# Patient Record
Sex: FEMALE | Race: WHITE | NOT HISPANIC OR LATINO | Employment: FULL TIME | URBAN - METROPOLITAN AREA
[De-identification: names, ages, dates, MRNs, and addresses within clinical notes are randomized per-mention and may not be internally consistent; named-entity substitution may affect disease eponyms.]

---

## 2021-03-26 DIAGNOSIS — Z23 ENCOUNTER FOR IMMUNIZATION: ICD-10-CM

## 2021-06-15 ENCOUNTER — VBI (OUTPATIENT)
Dept: ADMINISTRATIVE | Facility: OTHER | Age: 51
End: 2021-06-15

## 2021-06-15 RX ORDER — PROPRANOLOL/HYDROCHLOROTHIAZID 40 MG-25MG
1 TABLET ORAL DAILY
COMMUNITY

## 2021-06-15 RX ORDER — CALCIUM CARBONATE 300MG(750)
1 TABLET,CHEWABLE ORAL DAILY
COMMUNITY

## 2021-06-15 RX ORDER — PRASTERONE (DHEA) 50 MG
1 CAPSULE ORAL DAILY
COMMUNITY

## 2021-06-15 RX ORDER — ASPIRIN 325 MG
1 TABLET, DELAYED RELEASE (ENTERIC COATED) ORAL EVERY 24 HOURS
COMMUNITY

## 2021-06-15 RX ORDER — BACILLUS COAGULANS/INULIN 1B-250 MG
1 CAPSULE ORAL EVERY 24 HOURS
COMMUNITY

## 2021-06-15 NOTE — TELEPHONE ENCOUNTER
Upon review of the In Basket request we were able to locate, review, and update the patient chart as requested for Immunization(s) Influenza, Mammogram and Pap Smear (HPV) aka Cervical Cancer Screening  Any additional questions or concerns should be emailed to the Practice Liaisons via [email or web address] email, please do not reply via In Basket      Thank you  Kathryn Henderson

## 2021-06-16 ENCOUNTER — OFFICE VISIT (OUTPATIENT)
Dept: OBGYN CLINIC | Facility: CLINIC | Age: 51
End: 2021-06-16
Payer: COMMERCIAL

## 2021-06-16 VITALS
WEIGHT: 126 LBS | BODY MASS INDEX: 23.19 KG/M2 | HEIGHT: 62 IN | SYSTOLIC BLOOD PRESSURE: 100 MMHG | DIASTOLIC BLOOD PRESSURE: 68 MMHG

## 2021-06-16 DIAGNOSIS — N90.89 VULVAR LESION: Primary | ICD-10-CM

## 2021-06-16 PROCEDURE — 99213 OFFICE O/P EST LOW 20 MIN: CPT | Performed by: OBSTETRICS & GYNECOLOGY

## 2021-06-16 NOTE — PROGRESS NOTES
Assessment/Plan:      Diagnoses and all orders for this visit:    Vulvar lesion     -  Informed pt of findings c/w with small acne  Reassured pt of findings negative for any STD, infection or abscess  Advised to observe and inform GYN if acne increases in size    Other orders  -     Bacillus Coagulans-Inulin (Probiotic) 1-250 BILLION-MG CAPS; Take 1 capsule by mouth every 24 hours  -     aspirin (ECOTRIN) 325 mg EC tablet; Take 1 tablet by mouth every 24 hours  -     Elderberry 575 MG/5ML SYRP; Take by mouth daily  -     Turmeric 500 MG CAPS; Take 1 capsule by mouth daily  -     Cholecalciferol (Vitamin D3) 25 MCG (1000 UT) CHEW; Chew 1 tablet daily  -     Becky 500 MG CAPS; Take 1 capsule by mouth daily  -     Cancel: Mammo screening bilateral w 3d & cad; Future          Subjective:     Patient ID: Tammi Briggs is a 49 y o  female  Pt presents after having pain in right labia during riding a bike  On self exam patient noted a bump on right labia      Review of Systems   Constitutional: Negative for chills and fever  Gastrointestinal: Negative for abdominal pain  Genitourinary: Positive for genital sores  Negative for dysuria, pelvic pain and vaginal pain  Hematological: Negative for adenopathy  Objective:     Physical Exam  Abdominal:      Hernia: There is no hernia in the left inguinal area or right inguinal area  Genitourinary:     Exam position: Lithotomy position  Pubic Area: No rash  Labia:         Right: Lesion present  No rash, tenderness or injury  Left: No rash, tenderness, lesion or injury  Urethra: No prolapse or urethral pain  Comments: Right labia majora with acne measuring 1-2 mm, not able to express collection  Lymphadenopathy:      Lower Body: No right inguinal adenopathy  No left inguinal adenopathy

## 2022-05-16 ENCOUNTER — TELEPHONE (OUTPATIENT)
Dept: OBGYN CLINIC | Facility: CLINIC | Age: 52
End: 2022-05-16

## 2022-05-16 NOTE — TELEPHONE ENCOUNTER
Nichole l/tammie requesting mammogram order  Has well annual scheduled 6/2/2022  Prior mammo 7/22/2021  L/M on Nichole's v/tammie not due for mammogram until after 7/22/2022  Mammogram order will be provided at time of well annual 6/2/2022

## 2022-05-20 ENCOUNTER — TELEPHONE (OUTPATIENT)
Dept: OBGYN CLINIC | Facility: CLINIC | Age: 52
End: 2022-05-20

## 2022-05-20 NOTE — TELEPHONE ENCOUNTER
Pt called informing for over the past week she has developed nipple pain, tenderness, redness and states, " feels as if they are chaffed "  Pt has applied lanolin cream with no relief  Pt informed she does workout regularly, has tried changing her bra  Denies nipple drainage  Pt transferred to reception to schedule a breast check

## 2022-05-25 ENCOUNTER — OFFICE VISIT (OUTPATIENT)
Dept: OBGYN CLINIC | Facility: CLINIC | Age: 52
End: 2022-05-25
Payer: COMMERCIAL

## 2022-05-25 VITALS
HEIGHT: 62 IN | DIASTOLIC BLOOD PRESSURE: 70 MMHG | SYSTOLIC BLOOD PRESSURE: 118 MMHG | WEIGHT: 127.2 LBS | BODY MASS INDEX: 23.41 KG/M2

## 2022-05-25 DIAGNOSIS — N64.4 NIPPLE PAIN: Primary | ICD-10-CM

## 2022-05-25 PROCEDURE — 99213 OFFICE O/P EST LOW 20 MIN: CPT | Performed by: OBSTETRICS & GYNECOLOGY

## 2022-05-25 RX ORDER — TRIAMCINOLONE ACETONIDE 1 MG/G
CREAM TOPICAL 2 TIMES DAILY
Qty: 15 G | Refills: 0 | Status: SHIPPED | OUTPATIENT
Start: 2022-05-25

## 2022-05-25 NOTE — PROGRESS NOTES
[address] [facility], [address], [facility], [address]    Assessment/Plan:  1  Nipple pain  Comments:  c/o right nipple pain over last two weeks  Some improvement with lanolin to nipple  No other breast changes  A/P:   Exam essentially normal with mild irritation of right nipple  Likely trauma and inflammation  Recom bid topical steroid  If no improvement in 2 weeks call office  Pt has Elias Sanchez in  with Dr Cameron Valadez - will have exam then  Orders:  -     triamcinolone (KENALOG) 0 1 % cream; Apply topically 2 (two) times a day To affected area as needed        Subjective:   Mehnaz Winter is a 47 y o   female  CC: nipple pain      HPI:   Lori [place] presents today with c/o right nipple chafing and pain, reminds patient of issues when breastfeeding  First noted about  2 weeks ago noted chafing and pain around right nipple - noted after cleaning the house without a bra on  No pain left  Has tried applying lanolin - somewhat improved  No other changes to breasts  No nipple leaking    2021 mammogram birads 1  Gyn History  Patient's last menstrual period was 2022 (approximate)  Last pap smear: 2017    She  reports being sexually active and has had partner(s) who are male  She reports using the following method of birth control/protection: Male Sterilization         OB History      Past Medical History:  No date:  2 para 2  2017: Papanicolaou smear     Past Surgical History:  2000: APPENDECTOMY  2020: BUNIONECTOMY     Social History     Tobacco Use    Smoking status: Never Smoker    Smokeless tobacco: Never Used   Vaping Use    Vaping Use: Never used   Substance Use Topics    Alcohol use: Yes     Comment: socially    Drug use: Never     Comment: No use          Current Outpatient Medications:     triamcinolone (KENALOG) 0 1 % cream, Apply topically 2 (two) times a day To affected area as needed, Disp: 15 g, Rfl: 0    aspirin (ECOTRIN) 325 mg EC tablet, Take 1 tablet by mouth every 24 hours (Patient not taking: Reported on 5/25/2022), Disp: , Rfl:     Bacillus Coagulans-Inulin (Probiotic) 1-250 BILLION-MG CAPS, Take 1 capsule by mouth every 24 hours (Patient not taking: Reported on 5/25/2022), Disp: , Rfl:     Cholecalciferol (Vitamin D3) 25 MCG (1000 UT) CHEW, Chew 1 tablet daily (Patient not taking: Reported on 5/25/2022), Disp: , Rfl:     Elderberry 575 MG/5ML SYRP, Take by mouth daily (Patient not taking: Reported on 5/25/2022), Disp: , Rfl:     Becky 500 MG CAPS, Take 1 capsule by mouth daily (Patient not taking: Reported on 5/25/2022), Disp: , Rfl:     Turmeric 500 MG CAPS, Take 1 capsule by mouth daily (Patient not taking: Reported on 5/25/2022), Disp: , Rfl:     She is allergic to sulfamethoxazole-trimethoprim and sulfa antibiotics       ROS: Review of Systems   Constitutional: Negative  Gastrointestinal: Negative  Genitourinary: Negative  Skin:        Nipple pain right   Psychiatric/Behavioral: Negative  Objective:  /70   Ht 5' 2 25" (1 581 m)   Wt 57 7 kg (127 lb 3 2 oz)   LMP 05/04/2022 (Approximate)   Breastfeeding No   BMI 23 08 kg/m²      Physical Exam  Constitutional:       Appearance: Normal appearance  Chest:   Breasts:      Right: Skin change (nipple mildly irritated - no fissures or cuts) and tenderness (nipple only) present  No bleeding, mass or nipple discharge  Left: Normal        Neurological:      Mental Status: She is alert and oriented to person, place, and time     Psychiatric:         Behavior: Behavior normal

## 2022-06-02 ENCOUNTER — ANNUAL EXAM (OUTPATIENT)
Dept: OBGYN CLINIC | Facility: CLINIC | Age: 52
End: 2022-06-02
Payer: COMMERCIAL

## 2022-06-02 VITALS
WEIGHT: 125 LBS | BODY MASS INDEX: 23 KG/M2 | SYSTOLIC BLOOD PRESSURE: 100 MMHG | DIASTOLIC BLOOD PRESSURE: 60 MMHG | HEIGHT: 62 IN

## 2022-06-02 DIAGNOSIS — N64.4 NIPPLE TENDERNESS: ICD-10-CM

## 2022-06-02 DIAGNOSIS — Z12.4 SCREENING FOR CERVICAL CANCER: ICD-10-CM

## 2022-06-02 DIAGNOSIS — Z12.31 ENCOUNTER FOR SCREENING MAMMOGRAM FOR MALIGNANT NEOPLASM OF BREAST: ICD-10-CM

## 2022-06-02 DIAGNOSIS — Z01.419 ENCNTR FOR GYN EXAM (GENERAL) (ROUTINE) W/O ABN FINDINGS: Primary | ICD-10-CM

## 2022-06-02 PROCEDURE — S0612 ANNUAL GYNECOLOGICAL EXAMINA: HCPCS | Performed by: OBSTETRICS & GYNECOLOGY

## 2022-06-02 NOTE — PROGRESS NOTES
Annual Wellness Visit  [address], [address], [facility], [address] [facility]    ASSESSMENT/PLAN: Randee Winston is a 47 y o   who presents for annual gynecologic exam     Encounter for routine gynecologic examination  - Routine well woman exam completed today  - Cervical Cancer Screening: Current ASCCP Guidelines reviewed  Last Pap: 2022  Next Pap Due: today, routine   - Contraceptive counseling discussed  Current contraception: no method  - Breast Cancer Screening: Last Mammogram 2021  - Colorectal cancer screening last , next due uncertain  - The following were reviewed in today's visit: breast self exam    Additional problems addressed during this visit:  1  Encntr for gyn exam (general) (routine) w/o abn findings  -     IGP, Aptima HPV, Rfx 16/18,45    2  Encounter for screening mammogram for malignant neoplasm of breast  -     Mammo screening bilateral w 3d & cad; Future    3  Screening for cervical cancer  -     IGP, Aptima HPV, Rfx 16/18,45    4  Nipple tenderness        -  Advised patient to sleep without Bra  Continue to use Lanolin  If symptoms persist after 1 weeks, inform Gyn    Next visit: 1 yr      CC:  Annual Gynecologic Examination    HPI: Randee Winston is a 47 y o   who presents for annual gynecologic examination  Patient presents for Gyn exam   Denies having any concerns  Patient was evaluated for b/l nipple tenderness on 22 and was advised to treat with topical steroid  Right nipple with no tenderness, left nipple with still some discomfort  Patient using Lanolin and Triamcinolone 0 1% cream   LMP 22, denies having any change in nipple tenderness with menstrual cycle  Patient denies having any nipple discharge or erythema  Gyn History  Patient's last menstrual period was 2022 (approximate)  Last Pap: 2022 was normal    She  reports being sexually active and has had partner(s) who are male   She reports using the following method of birth control/protection: Male Sterilization  OB History      Past Medical History:  No date:  2 para 2  2017: Papanicolaou smear     Past Surgical History:  2000: APPENDECTOMY  2020: BUNIONECTOMY     Family History   Problem Relation Age of Onset    Diabetes Father     Hyperlipidemia Father     Heart attack Father     Heart disease Father     Other Son         born with congenital short femur    Heart disease Maternal Grandmother     Cancer Paternal Grandfather         oral        Social History     Tobacco Use    Smoking status: Never Smoker    Smokeless tobacco: Never Used   Vaping Use    Vaping Use: Never used   Substance Use Topics    Alcohol use: Yes     Comment: socially    Drug use: Never     Comment: No use          Current Outpatient Medications:     triamcinolone (KENALOG) 0 1 % cream, Apply topically 2 (two) times a day To affected area as needed, Disp: 15 g, Rfl: 0    aspirin (ECOTRIN) 325 mg EC tablet, Take 1 tablet by mouth every 24 hours (Patient not taking: Reported on 2022), Disp: , Rfl:     Bacillus Coagulans-Inulin (Probiotic) 1-250 BILLION-MG CAPS, Take 1 capsule by mouth every 24 hours (Patient not taking: Reported on 2022), Disp: , Rfl:     Cholecalciferol (Vitamin D3) 25 MCG (1000 UT) CHEW, Chew 1 tablet daily (Patient not taking: Reported on 2022), Disp: , Rfl:     Elderberry 575 MG/5ML SYRP, Take by mouth daily (Patient not taking: Reported on 2022), Disp: , Rfl:     Ginger 500 MG CAPS, Take 1 capsule by mouth daily (Patient not taking: Reported on 2022), Disp: , Rfl:     Turmeric 500 MG CAPS, Take 1 capsule by mouth daily (Patient not taking: Reported on 2022), Disp: , Rfl:     She is allergic to sulfamethoxazole-trimethoprim and sulfa antibiotics       ROS negative except as noted in HPI    Objective:  /60 (BP Location: Left arm, Patient Position: Sitting, Cuff Size: Standard)   Ht 5' 2 25" (1 581 m)   Wt 56 7 kg (125 lb)   LMP 05/04/2022 (Approximate)   BMI 22 68 kg/m²      Physical Exam  Vitals and nursing note reviewed  HENT:      Head: Normocephalic  Chest:   Breasts: Breasts are symmetrical       Right: Normal  No bleeding, mass, nipple discharge, skin change, tenderness or axillary adenopathy  Left: Normal  No bleeding, mass, nipple discharge, skin change, tenderness or axillary adenopathy  Abdominal:      General: There is no distension  Palpations: Abdomen is soft  There is no mass  Tenderness: There is no abdominal tenderness  There is no rebound  Genitourinary:     General: Normal vulva  Exam position: Lithotomy position  Labia:         Right: No rash, tenderness or lesion  Left: No rash, tenderness or lesion  Urethra: No urethral pain or urethral lesion  Vagina: Normal  No vaginal discharge  Cervix: No discharge, friability, lesion or erythema  Uterus: Normal        Adnexa: Right adnexa normal and left adnexa normal       Rectum: No external hemorrhoid  Musculoskeletal:      Right lower leg: No edema  Left lower leg: No edema  Lymphadenopathy:      Upper Body:      Right upper body: No axillary or pectoral adenopathy  Left upper body: No axillary or pectoral adenopathy  Skin:     General: Skin is warm  Neurological:      Mental Status: She is alert and oriented to person, place, and time  Psychiatric:         Mood and Affect: Mood normal          Behavior: Behavior normal          Thought Content:  Thought content normal

## 2022-06-07 LAB
CYTOLOGIST CVX/VAG CYTO: NORMAL
DX ICD CODE: NORMAL
HPV I/H RISK 4 DNA CVX QL PROBE+SIG AMP: NEGATIVE
OTHER STN SPEC: NORMAL
PATH REPORT.FINAL DX SPEC: NORMAL
SL AMB NOTE:: NORMAL
SL AMB SPECIMEN ADEQUACY: NORMAL
SL AMB TEST METHODOLOGY: NORMAL

## 2022-06-09 ENCOUNTER — TELEPHONE (OUTPATIENT)
Dept: OBGYN CLINIC | Facility: CLINIC | Age: 52
End: 2022-06-09

## 2022-06-09 NOTE — TELEPHONE ENCOUNTER
Pt was seen and evaluated on 5/25/22 for  bilat nipple tenderness/chaffing, placed on triamcinolone cream and than seen on 6/02/22 for WA  Upon exam nipple on the left breat was healed  Pt reports she continues to tenderness and chaffing to he underside of the left nipple, reports its "better but still feeling is still there "   Pt questioning if should continue current triamcinolone cream or  is there an alternative treatment plan recommended? Or Would you like to see pt for an appointment for re-evaluation  Please advise

## 2022-06-10 NOTE — TELEPHONE ENCOUNTER
On exam b/l breasts and nipples appeared unremarkable  Patient was advised to sleep without Bra at bedtime to decrease possible skin irritation from heat and moisture  Please advise patient to apply A&D ointment on bilateral nipples during the day, observe symptoms   If bilateral nipple tenderness/chaffing persists, would recommend consult with breast specialist

## 2022-06-10 NOTE — TELEPHONE ENCOUNTER
Spoke with patient and provided recommendation to sleep without Bra at bedtime to decrease possible skin irritation from heat and moisture, apply A&D ointment on bilateral nipples during the day, observe symptoms   If bilateral nipple tenderness/chaffing persists, would recommend consult with breast specialist

## 2022-06-15 ENCOUNTER — TELEPHONE (OUTPATIENT)
Dept: OBGYN CLINIC | Facility: CLINIC | Age: 52
End: 2022-06-15

## 2022-06-15 NOTE — TELEPHONE ENCOUNTER
I would recommend Dr Joey Xiong [place] [facility] since that is where Sosa Anderson gets her mammograms, so they will have access to prior imaging  Ph: [phone]

## 2022-06-15 NOTE — TELEPHONE ENCOUNTER
Clifford Elder states her Right breast nipple has not improved  She is requesting to get name of Breast Specialist  Sent message to Dr Taylor Briceno

## 2022-06-16 NOTE — TELEPHONE ENCOUNTER
Spoke with patient and provided contact information for Dr Jelani Waller [place] [facility] schedule further evaluation  Pt informed after some research she tried OTC Lotrimin cream to a affect area and burning sensation has improved, pt feels it may be an yeast issue  Encouraged to schedule evaluation and alternative treatment recommendations  Pt appreciative of call back

## 2023-09-22 ENCOUNTER — TELEPHONE (OUTPATIENT)
Dept: OBGYN CLINIC | Facility: CLINIC | Age: 53
End: 2023-09-22

## 2023-09-22 DIAGNOSIS — Z12.31 ENCOUNTER FOR SCREENING MAMMOGRAM FOR MALIGNANT NEOPLASM OF BREAST: Primary | ICD-10-CM

## 2023-09-22 NOTE — TELEPHONE ENCOUNTER
Patient called requesting screening mammogram order to [facility]. She was last seen 06/2022 and pt did not schedule a WA yet. Advised her she need to schedule a WA, transferred her to  and she strictly want to see Dr. Vin Lara and her schedule is booked out until Jan 2024 and was advised to call back in Oct to schedule. Screening mammogram order placed and she request for the  to mail it to her.   mailed the order per pt request.

## 2024-01-04 DIAGNOSIS — Z12.31 ENCOUNTER FOR SCREENING MAMMOGRAM FOR MALIGNANT NEOPLASM OF BREAST: ICD-10-CM

## 2024-02-14 ENCOUNTER — ANNUAL EXAM (OUTPATIENT)
Dept: OBGYN CLINIC | Facility: CLINIC | Age: 54
End: 2024-02-14
Payer: COMMERCIAL

## 2024-02-14 VITALS
HEIGHT: 62 IN | WEIGHT: 132.4 LBS | DIASTOLIC BLOOD PRESSURE: 70 MMHG | BODY MASS INDEX: 24.37 KG/M2 | SYSTOLIC BLOOD PRESSURE: 108 MMHG

## 2024-02-14 DIAGNOSIS — Z01.419 ENCOUNTER FOR ANNUAL ROUTINE GYNECOLOGICAL EXAMINATION: Primary | ICD-10-CM

## 2024-02-14 DIAGNOSIS — Z12.31 ENCOUNTER FOR SCREENING MAMMOGRAM FOR MALIGNANT NEOPLASM OF BREAST: ICD-10-CM

## 2024-02-14 PROCEDURE — S0612 ANNUAL GYNECOLOGICAL EXAMINA: HCPCS | Performed by: OBSTETRICS & GYNECOLOGY

## 2024-02-14 NOTE — PATIENT INSTRUCTIONS
- Maintain healthy weight with BMI ideally between 18-25.    - Eat a healthy diet, including multiple servings of vegetables and fruits, as well as lean protein sources.  - Get at least 150 minutes of moderate aerobic activity or 75 minutes of vigorous aerobic activity a week, or a combination of moderate and vigorous activity.  Greater amounts of exercise will provide an even greater health benefit.   - Ensure diet provides 1200mg of Calcium daily (divided) and 800IU of vitamin D, or take supplements to meet this.  - Safe sex practices recommended.    - Resources - information on birth control and sexually transmitted infections - www.bedsider.org            - information on sexually transmitted infections - www.cdc.gov/std/     Strong Bones at Every Age    What is Calcium and What Does it Do?  Calcium is a mineral that is necessary for life. In addition to building bones and keeping them healthy, calcium helps our blood clot, nerves send messages and muscles contract. About 99 percent of the calcium in our bodies is in our bones and teeth. Each day, we lose calcium through our skin, nails, hair, sweat, urine and feces, but our bodies cannot produce new calcium.  That's why it's important to try to get calcium from the food we eat. When we don't get enough calcium for our body's needs, it is taken from our bones.      Daily Recommendations for Ca and Vit D (FDA, IOM)   Age Calcium Vitamin D   Teen - 51yo 1,000mg 600IU   52yo and older 1,200mg 800IU   Maximum Daily 2,000-2,500mg 4,000IU     Calcium should be spread out throughout the day for better absorption, ~500mg at a time.  Find a Calcium Calculator @ www.osteoporosis.foundation/educational-hub/topic/calcium-calculator    Vitamin D: Vitamin D is in fish oils, some vegetables, and fortified milk, cereal, and bread. Vitamin D is also formed in the skin when it is exposed to the sun.     Dietary Calcium Amounts  Food Amount     Calcium (mg)      Milk (skim, low  fat, whole) 1 cup 300       Cottage Cheese 1/2 cup 65   Ice Cream or Ice Milk 1/2 cup 100   Sour Cream, cultured 1 cup 250   Soy Milk, calcium fortified 1 cup 200 to 400   Yogurt 1 cup 450   Yogurt drink 12 oz 300   Nonfat dry milk powder 5 Tbsp 300   Brie Cheese 1 oz 50   Hard Cheese (cheddar, richard)     1 oz 200   Mozzarella 1 oz 200   Parmesan Cheese 1 Tbsp 70   Broccoli, cooked 1 cup 180   Kale, raw 1 cup 55   Orange Juice, Ca++ fortified 1 cup 300   Spinach, cooked 1 cup 240

## 2024-02-14 NOTE — LETTER
2024     Arturo Hong MD  [address] [facility] [address]    Patient: Nichole Bolaños   YOB: 1970   Date of Visit: 2024       Dear Dr. Hong:    Thank you for referring Nichole Bolaños to me for evaluation. Below are my notes for this consultation.    If you have questions, please do not hesitate to call me. I look forward to following your patient along with you.         Sincerely,        Genia Saravia MD        CC: No Recipients    Genia Saravia MD  2024  4:46 PM  Sign when Signing Visit  Annual Wellness Visit  [facility] - [address], [address], [place] [address]    ASSESSMENT/PLAN: Nichole Bolaños is a 54 y.o.  who presents for annual gynecologic exam.    Encounter for routine gynecologic examination  - Routine well woman exam completed today.  - Cervical Cancer Screening: Current ASCCP Guidelines reviewed. Last Pap: 2022 normal. Next Pap Due: 2 years, routine.  - Contraceptive counseling discussed.  Current contraception: vasectomy,   - Breast Cancer Screening: Last Mammogram 2023, normal  - Colorectal cancer screening last - cologuard 2022, next due .  - The following were reviewed in today's visit: mammography screening ordered, menopause, exercise, and healthy diet    Additional problems addressed during this visit:  1. Encounter for annual routine gynecological examination    2. Encounter for screening mammogram for malignant neoplasm of breast  -     Mammo screening bilateral w 3d & cad; Future        Next visit: 1 year Wellness      CC:  Annual Gynecologic Examination    HPI: Nichole Bolaños is a 54 y.o.  who presents for annual gynecologic examination.  She denies any breast, urinary or pelvic issues at today's visit.    Monthly periods mostly - once skipped a month around time had Covid.  Now bleeding a bit shorter.  Sexually active, vasectomy, no new partners.        Gyn History  Patient's last menstrual period was 2024  "(exact date).     Last Pap: 2022 was normal    She  reports being sexually active and has had partner(s) who are male. She reports using the following method of birth control/protection: Male Sterilization.       OB History      Past Medical History:  2023: History of screening mammography      Comment:  negative per pt. Done at [facility]     Past Surgical History:  2000: APPENDECTOMY  2020: BUNIONECTOMY     Family History   Problem Relation Age of Onset   • Diabetes Father    • Hyperlipidemia Father    • Heart attack Father    • Heart disease Father    • Cancer Father    • Heart disease Maternal Grandmother    • Cancer Paternal Grandfather         oral   • Other Son         born with congenital short femur   • Breast cancer Neg Hx    • Uterine cancer Neg Hx    • Ovarian cancer Neg Hx    • Colon cancer Neg Hx         Social History     Tobacco Use   • Smoking status: Never   • Smokeless tobacco: Never   Vaping Use   • Vaping status: Never Used   Substance Use Topics   • Alcohol use: Yes     Alcohol/week: 2.0 standard drinks of alcohol     Types: 2 Glasses of wine per week     Comment: socially   • Drug use: Never     Comment: No use        No current outpatient medications on file.    She is allergic to sulfamethoxazole-trimethoprim and sulfa antibiotics..    ROS negative except as noted in HPI    Objective:  /70   Ht 5' 1.75\" (1.568 m)   Wt 60.1 kg (132 lb 6.4 oz)   LMP 2024 (Exact Date)   Breastfeeding No   BMI 24.41 kg/m²      Physical Exam  Constitutional:       Appearance: Normal appearance.   Chest:   Breasts:     Right: Normal. No mass or tenderness.      Left: Normal. No mass or tenderness.   Abdominal:      Palpations: Abdomen is soft.      Tenderness: There is no abdominal tenderness.   Genitourinary:     General: Normal vulva.      Vagina: No bleeding or lesions.      Cervix: Normal.      Uterus: Normal. Not tender.       Adnexa:         Right: No mass or tenderness.          " Left: No mass or tenderness.        Rectum: No mass or external hemorrhoid. Normal anal tone.   Musculoskeletal:         General: Normal range of motion.   Lymphadenopathy:      Upper Body:      Right upper body: No axillary adenopathy.      Left upper body: No axillary adenopathy.   Neurological:      Mental Status: She is alert and oriented to person, place, and time.   Psychiatric:         Mood and Affect: Mood normal.         Behavior: Behavior normal.

## 2024-02-14 NOTE — PROGRESS NOTES
Annual Wellness Visit  [facility] - [address], [address], [place] [address]    ASSESSMENT/PLAN: Nichole Bolaños is a 54 y.o.  who presents for annual gynecologic exam.    Encounter for routine gynecologic examination  - Routine well woman exam completed today.  - Cervical Cancer Screening: Current ASCCP Guidelines reviewed. Last Pap: 2022 normal. Next Pap Due: 2 years, routine.  - Contraceptive counseling discussed.  Current contraception: vasectomy,   - Breast Cancer Screening: Last Mammogram 2023, normal  - Colorectal cancer screening last - cologuard 2022, next due .  - The following were reviewed in today's visit: mammography screening ordered, menopause, exercise, and healthy diet    Additional problems addressed during this visit:  1. Encounter for annual routine gynecological examination    2. Encounter for screening mammogram for malignant neoplasm of breast  -     Mammo screening bilateral w 3d & cad; Future        Next visit: 1 year Wellness      CC:  Annual Gynecologic Examination    HPI: Nichole Bolaños is a 54 y.o.  who presents for annual gynecologic examination.  She denies any breast, urinary or pelvic issues at today's visit.    Monthly periods mostly - once skipped a month around time had Covid.  Now bleeding a bit shorter.  Sexually active, vasectomy, no new partners.        Gyn History  Patient's last menstrual period was 2024 (exact date).     Last Pap: 2022 was normal    She  reports being sexually active and has had partner(s) who are male. She reports using the following method of birth control/protection: Male Sterilization.       OB History      Past Medical History:  2023: History of screening mammography      Comment:  negative per pt. Done at [facility]     Past Surgical History:  2000: APPENDECTOMY  2020: BUNIONECTOMY     Family History   Problem Relation Age of Onset    Diabetes Father     Hyperlipidemia Father     Heart attack  "Father     Heart disease Father     Cancer Father     Heart disease Maternal Grandmother     Cancer Paternal Grandfather         oral    Other Son         born with congenital short femur    Breast cancer Neg Hx     Uterine cancer Neg Hx     Ovarian cancer Neg Hx     Colon cancer Neg Hx         Social History     Tobacco Use    Smoking status: Never    Smokeless tobacco: Never   Vaping Use    Vaping status: Never Used   Substance Use Topics    Alcohol use: Yes     Alcohol/week: 2.0 standard drinks of alcohol     Types: 2 Glasses of wine per week     Comment: socially    Drug use: Never     Comment: No use        No current outpatient medications on file.    She is allergic to sulfamethoxazole-trimethoprim and sulfa antibiotics..    ROS negative except as noted in HPI    Objective:  /70   Ht 5' 1.75\" (1.568 m)   Wt 60.1 kg (132 lb 6.4 oz)   LMP 01/20/2024 (Exact Date)   Breastfeeding No   BMI 24.41 kg/m²      Physical Exam  Constitutional:       Appearance: Normal appearance.   Chest:   Breasts:     Right: Normal. No mass or tenderness.      Left: Normal. No mass or tenderness.   Abdominal:      Palpations: Abdomen is soft.      Tenderness: There is no abdominal tenderness.   Genitourinary:     General: Normal vulva.      Vagina: No bleeding or lesions.      Cervix: Normal.      Uterus: Normal. Not tender.       Adnexa:         Right: No mass or tenderness.          Left: No mass or tenderness.        Rectum: No mass or external hemorrhoid. Normal anal tone.   Musculoskeletal:         General: Normal range of motion.   Lymphadenopathy:      Upper Body:      Right upper body: No axillary adenopathy.      Left upper body: No axillary adenopathy.   Neurological:      Mental Status: She is alert and oriented to person, place, and time.   Psychiatric:         Mood and Affect: Mood normal.         Behavior: Behavior normal.         "

## 2025-02-20 ENCOUNTER — ANNUAL EXAM (OUTPATIENT)
Dept: OBGYN CLINIC | Facility: CLINIC | Age: 55
End: 2025-02-20
Payer: COMMERCIAL

## 2025-02-20 VITALS
SYSTOLIC BLOOD PRESSURE: 100 MMHG | DIASTOLIC BLOOD PRESSURE: 60 MMHG | BODY MASS INDEX: 24.66 KG/M2 | WEIGHT: 134 LBS | HEIGHT: 62 IN

## 2025-02-20 DIAGNOSIS — Z12.31 ENCOUNTER FOR SCREENING MAMMOGRAM FOR MALIGNANT NEOPLASM OF BREAST: ICD-10-CM

## 2025-02-20 DIAGNOSIS — Z12.11 SCREENING FOR COLON CANCER: ICD-10-CM

## 2025-02-20 DIAGNOSIS — Z01.419 ENCOUNTER FOR ANNUAL ROUTINE GYNECOLOGICAL EXAMINATION: Primary | ICD-10-CM

## 2025-02-20 PROCEDURE — S0612 ANNUAL GYNECOLOGICAL EXAMINA: HCPCS | Performed by: OBSTETRICS & GYNECOLOGY

## 2025-02-20 NOTE — PROGRESS NOTES
Annual Wellness Visit  [facility] - [address], [address], [place] [address]    ASSESSMENT/PLAN: Nichole Bolaños is a 55 y.o.  who presents for annual gynecologic exam.    Assessment & Plan  Encounter for annual routine gynecological examination  - Routine well woman exam completed today.  - Cervical Cancer Screening: Current ASCCP Guidelines reviewed. Last Pap: 2022 normal. Past abnormal pap: none.  Next Pap Due: 1 year.  - Contraceptive counseling discussed.  Current contraception: vasectomy,   - Breast Cancer Screening: Last Mammogram 2023, normal  - Colorectal cancer screening last Cologuard 2022, next due - now, order placed.  - The following were reviewed in today's visit: mammography screening ordered, menopause, adequate intake of calcium and vitamin D, exercise, and healthy diet       Encounter for screening mammogram for malignant neoplasm of breast    Orders:    Mammo screening bilateral w 3d and cad; Future    Screening for colon cancer  Ordered cologuard - low risk.  Last 2022.  Orders:    Cologuard      Next visit: 1 year Wellness      CC:  Annual Gynecologic Examination    HPI: Nichole Bolaños is a 55 y.o.  who presents for annual gynecologic examination.  She denies any breast, urinary or pelvic issues at today's visit.    Periods very irregular.  Skips multiple months.  Some regular, so very light.  No real menopausal symptoms.  Sexually active, no new partners.  Vasectomy.      Gyn History  Patient's last menstrual period was 2024 (approximate).     Last Pap: 2022 was normal    She  reports being sexually active and has had partner(s) who are male. She reports using the following method of birth control/protection: Male Sterilization.       OB History      Past Medical History:  2023: History of screening mammography      Comment:  negative per pt. Done at [facility]     Past Surgical History:  2000: APPENDECTOMY  2020: BUNIONECTOMY  "    Family History   Problem Relation Age of Onset    Diabetes Father     Hyperlipidemia Father     Heart attack Father     Heart disease Father     Cancer Father     Heart disease Maternal Grandmother     Cancer Paternal Grandfather         oral    Other Son         born with congenital short femur    Breast cancer Neg Hx     Uterine cancer Neg Hx     Ovarian cancer Neg Hx     Colon cancer Neg Hx         Social History     Tobacco Use    Smoking status: Never    Smokeless tobacco: Never   Vaping Use    Vaping status: Never Used   Substance Use Topics    Alcohol use: Yes     Alcohol/week: 2.0 standard drinks of alcohol     Types: 2 Glasses of wine per week     Comment: socially    Drug use: Never     Comment: No use        No current outpatient medications on file.    She is allergic to sulfamethoxazole-trimethoprim and sulfa antibiotics..    ROS negative except as noted in HPI    Objective:  /60 (BP Location: Left arm, Patient Position: Sitting, Cuff Size: Standard)   Ht 5' 1.75\" (1.568 m)   Wt 60.8 kg (134 lb)   LMP 12/05/2024 (Approximate)   BMI 24.71 kg/m²      Physical Exam  Constitutional:       Appearance: Normal appearance.   Chest:   Breasts:     Right: Normal. No mass or tenderness.      Left: Normal. No mass or tenderness.   Abdominal:      Palpations: Abdomen is soft.      Tenderness: There is no abdominal tenderness.   Genitourinary:     General: Normal vulva.      Vagina: No bleeding or lesions.      Cervix: Normal.      Uterus: Normal. Not tender.       Adnexa:         Right: No mass or tenderness.          Left: No mass or tenderness.        Rectum: No mass or external hemorrhoid. Normal anal tone.      Comments: Atrophic changes appropriate to age  Musculoskeletal:         General: Normal range of motion.   Lymphadenopathy:      Upper Body:      Right upper body: No axillary adenopathy.      Left upper body: No axillary adenopathy.   Neurological:      Mental Status: She is alert and " oriented to person, place, and time.   Psychiatric:         Mood and Affect: Mood normal.         Behavior: Behavior normal.

## 2025-02-20 NOTE — PATIENT INSTRUCTIONS
- Maintain healthy weight with BMI ideally between 18-25.    - Eat a healthy diet, including multiple servings of vegetables and fruits, as well as lean protein sources.  - Get at least 150 minutes of moderate aerobic activity or 75 minutes of vigorous aerobic activity a week, or a combination of moderate and vigorous activity.  Greater amounts of exercise will provide an even greater health benefit.   - Ensure diet provides 1200mg of Calcium daily (divided) and 800IU of vitamin D, or take supplements to meet this.  - Safe sex practices recommended.    - Resources - information on birth control and sexually transmitted infections - www.bedsider.org            - information on sexually transmitted infections - www.cdc.gov/std/     Strong Bones at Every Age    What is Calcium and What Does it Do?  Calcium is a mineral that is necessary for life. In addition to building bones and keeping them healthy, calcium helps our blood clot, nerves send messages and muscles contract. About 99 percent of the calcium in our bodies is in our bones and teeth. Each day, we lose calcium through our skin, nails, hair, sweat, urine and feces, but our bodies cannot produce new calcium.  That's why it's important to try to get calcium from the food we eat. When we don't get enough calcium for our body's needs, it is taken from our bones.      Daily Recommendations for Ca and Vit D (FDA, IOM)   Age Calcium Vitamin D   Teen - 49yo 1,000mg 600IU   50yo and older 1,200mg 800IU   Maximum Daily 2,000-2,500mg 4,000IU     Calcium should be spread out throughout the day for better absorption, ~500mg at a time.  Find a Calcium Calculator @ www.osteoporosis.foundation/educational-hub/topic/calcium-calculator    Vitamin D: Vitamin D is in fish oils, some vegetables, and fortified milk, cereal, and bread. Vitamin D is also formed in the skin when it is exposed to the sun.     Dietary Calcium Amounts  Food Amount     Calcium (mg)      Milk (skim, low  fat, whole) 1 cup 300       Cottage Cheese 1/2 cup 65   Ice Cream or Ice Milk 1/2 cup 100   Sour Cream, cultured 1 cup 250   Soy Milk, calcium fortified 1 cup 200 to 400   Yogurt 1 cup 450   Yogurt drink 12 oz 300   Nonfat dry milk powder 5 Tbsp 300   Brie Cheese 1 oz 50   Hard Cheese (cheddar, richard)     1 oz 200   Mozzarella 1 oz 200   Parmesan Cheese 1 Tbsp 70   Broccoli, cooked 1 cup 180   Kale, raw 1 cup 55   Orange Juice, Ca++ fortified 1 cup 300   Spinach, cooked 1 cup 240       Calcium supplements - Which should I take?    The calcium in supplements is found in combination with another substance, typically carbonate or citrate. Each has benefits and downsides. Calcium carbonate supplements tends to be the best value, because they contain the highest amount of elemental calcium (about 40% by weight). Because calcium carbonate requires stomach acid for absorption, it's best to take this product with food. Most people tolerate calcium carbonate well, but some people complain of mild constipation or feeling bloated. Some well-known calcium carbonate products include Caltrate, Viactiv Calcium Chews, Os-Gonzales, and Tums.  Calcium citrate supplements are absorbed more easily than calcium carbonate. They can be taken on an empty stomach and are more readily absorbed by people who take acid-reducing heartburn medications. But because calcium citrate is only 21% calcium, you may need to take more tablets to get your daily requirement. Calcium citrate products include Citracal and [employer] Calcimate Plus 800.

## 2025-02-20 NOTE — LETTER
2025     Arturo Hong MD  [address] [address]    Patient: Nichole Bolaños   YOB: 1970   Date of Visit: 2025       Dear Dr. Hong:    Thank you for referring Nichole Bolaños to me for evaluation. Below are my notes for this consultation.    If you have questions, please do not hesitate to call me. I look forward to following your patient along with you.         Sincerely,        Genia Saravia MD        CC: No Recipients    Genia Saravia MD  2025  3:58 PM  Sign when Signing Visit  Annual Wellness Visit  [facility] - [address], [address], [place] [address]    ASSESSMENT/PLAN: Nichole Bolaños is a 55 y.o.  who presents for annual gynecologic exam.    Assessment & Plan  Encounter for annual routine gynecological examination  - Routine well woman exam completed today.  - Cervical Cancer Screening: Current ASCCP Guidelines reviewed. Last Pap: 2022 normal. Past abnormal pap: none.  Next Pap Due: 1 year.  - Contraceptive counseling discussed.  Current contraception: vasectomy,   - Breast Cancer Screening: Last Mammogram 2023, normal  - Colorectal cancer screening last Cologuard 2022, next due - now, order placed.  - The following were reviewed in today's visit: mammography screening ordered, menopause, adequate intake of calcium and vitamin D, exercise, and healthy diet       Encounter for screening mammogram for malignant neoplasm of breast    Orders:  •  Mammo screening bilateral w 3d and cad; Future    Screening for colon cancer  Ordered cologuard - low risk.  Last 2022.  Orders:  •  Cologuard      Next visit: 1 year Wellness      CC:  Annual Gynecologic Examination    HPI: Nichole Bolaños is a 55 y.o.  who presents for annual gynecologic examination.  She denies any breast, urinary or pelvic issues at today's visit.    Periods very irregular.  Skips multiple months.  Some regular, so very light.  No real menopausal  "symptoms.  Sexually active, no new partners.  Vasectomy.      Gyn History  Patient's last menstrual period was 2024 (approximate).     Last Pap: 2022 was normal    She  reports being sexually active and has had partner(s) who are male. She reports using the following method of birth control/protection: Male Sterilization.       OB History      Past Medical History:  2023: History of screening mammography      Comment:  negative per pt. Done at [facility]     Past Surgical History:  2000: APPENDECTOMY  2020: BUNIONECTOMY     Family History   Problem Relation Age of Onset   • Diabetes Father    • Hyperlipidemia Father    • Heart attack Father    • Heart disease Father    • Cancer Father    • Heart disease Maternal Grandmother    • Cancer Paternal Grandfather         oral   • Other Son         born with congenital short femur   • Breast cancer Neg Hx    • Uterine cancer Neg Hx    • Ovarian cancer Neg Hx    • Colon cancer Neg Hx         Social History     Tobacco Use   • Smoking status: Never   • Smokeless tobacco: Never   Vaping Use   • Vaping status: Never Used   Substance Use Topics   • Alcohol use: Yes     Alcohol/week: 2.0 standard drinks of alcohol     Types: 2 Glasses of wine per week     Comment: socially   • Drug use: Never     Comment: No use        No current outpatient medications on file.    She is allergic to sulfamethoxazole-trimethoprim and sulfa antibiotics..    ROS negative except as noted in HPI    Objective:  /60 (BP Location: Left arm, Patient Position: Sitting, Cuff Size: Standard)   Ht 5' 1.75\" (1.568 m)   Wt 60.8 kg (134 lb)   LMP 2024 (Approximate)   BMI 24.71 kg/m²      Physical Exam  Constitutional:       Appearance: Normal appearance.   Chest:   Breasts:     Right: Normal. No mass or tenderness.      Left: Normal. No mass or tenderness.   Abdominal:      Palpations: Abdomen is soft.      Tenderness: There is no abdominal tenderness.   Genitourinary:     General: " Normal vulva.      Vagina: No bleeding or lesions.      Cervix: Normal.      Uterus: Normal. Not tender.       Adnexa:         Right: No mass or tenderness.          Left: No mass or tenderness.        Rectum: No mass or external hemorrhoid. Normal anal tone.      Comments: Atrophic changes appropriate to age  Musculoskeletal:         General: Normal range of motion.   Lymphadenopathy:      Upper Body:      Right upper body: No axillary adenopathy.      Left upper body: No axillary adenopathy.   Neurological:      Mental Status: She is alert and oriented to person, place, and time.   Psychiatric:         Mood and Affect: Mood normal.         Behavior: Behavior normal.

## 2025-03-14 LAB — COLOGUARD RESULT REPORTABLE: NEGATIVE

## 2025-04-12 ENCOUNTER — HOSPITAL ENCOUNTER (OUTPATIENT)
Dept: MAMMOGRAPHY | Facility: CLINIC | Age: 55
Discharge: HOME | End: 2025-04-12
Payer: COMMERCIAL

## 2025-04-12 VITALS — WEIGHT: 134 LBS | BODY MASS INDEX: 25.3 KG/M2 | HEIGHT: 61 IN

## 2025-04-12 DIAGNOSIS — Z12.31 ENCOUNTER FOR SCREENING MAMMOGRAM FOR MALIGNANT NEOPLASM OF BREAST: ICD-10-CM

## 2025-04-12 PROCEDURE — 77063 BREAST TOMOSYNTHESIS BI: CPT

## 2025-04-12 PROCEDURE — 77067 SCR MAMMO BI INCL CAD: CPT
